# Patient Record
Sex: FEMALE | Race: WHITE | Employment: OTHER | URBAN - METROPOLITAN AREA
[De-identification: names, ages, dates, MRNs, and addresses within clinical notes are randomized per-mention and may not be internally consistent; named-entity substitution may affect disease eponyms.]

---

## 2017-01-01 ENCOUNTER — SNF VISIT (OUTPATIENT)
Dept: FAMILY MEDICINE CLINIC | Facility: CLINIC | Age: 82
End: 2017-01-01

## 2017-01-01 DIAGNOSIS — F41.1 ANXIETY STATE: ICD-10-CM

## 2017-01-01 DIAGNOSIS — I48.20 CHRONIC ATRIAL FIBRILLATION (HCC): ICD-10-CM

## 2017-01-01 DIAGNOSIS — M17.10 OSTEOARTHRITIS OF KNEE, UNSPECIFIED LATERALITY, UNSPECIFIED OSTEOARTHRITIS TYPE: ICD-10-CM

## 2017-01-01 DIAGNOSIS — R06.03 RESPIRATORY DISTRESS: ICD-10-CM

## 2017-01-01 DIAGNOSIS — I10 ESSENTIAL HYPERTENSION: ICD-10-CM

## 2017-01-01 DIAGNOSIS — R53.1 WEAKNESS GENERALIZED: ICD-10-CM

## 2017-01-01 DIAGNOSIS — Z71.89 ENCOUNTER FOR HOSPICE CARE DISCUSSION: Primary | ICD-10-CM

## 2017-01-01 DIAGNOSIS — J43.9 PULMONARY EMPHYSEMA, UNSPECIFIED EMPHYSEMA TYPE (HCC): ICD-10-CM

## 2017-01-01 DIAGNOSIS — H91.8X9 OTHER SPECIFIED FORMS OF HEARING LOSS, UNSPECIFIED LATERALITY: ICD-10-CM

## 2017-01-01 DIAGNOSIS — K21.9 GASTROESOPHAGEAL REFLUX DISEASE WITHOUT ESOPHAGITIS: ICD-10-CM

## 2017-01-01 DIAGNOSIS — Z91.81 AT RISK FOR FALLING: ICD-10-CM

## 2017-01-01 DIAGNOSIS — C91.10 CHRONIC LYMPHOCYTIC LEUKEMIA (HCC): ICD-10-CM

## 2017-01-01 DIAGNOSIS — G47.00 INSOMNIA, UNSPECIFIED: ICD-10-CM

## 2017-01-01 DIAGNOSIS — C91.10 CHRONIC LYMPHOCYTIC LEUKEMIA (HCC): Primary | ICD-10-CM

## 2017-01-01 DIAGNOSIS — J44.1 CHRONIC OBSTRUCTIVE PULMONARY DISEASE WITH ACUTE EXACERBATION (HCC): Primary | ICD-10-CM

## 2017-01-01 DIAGNOSIS — Z87.09 HISTORY OF PULMONARY EDEMA: ICD-10-CM

## 2017-01-01 DIAGNOSIS — D64.9 ANEMIA, UNSPECIFIED: ICD-10-CM

## 2017-01-01 PROCEDURE — 99310 SBSQ NF CARE HIGH MDM 45: CPT | Performed by: FAMILY MEDICINE

## 2017-01-01 PROCEDURE — 99307 SBSQ NF CARE SF MDM 10: CPT | Performed by: FAMILY MEDICINE

## 2017-02-19 PROBLEM — K21.9 GASTROESOPHAGEAL REFLUX DISEASE WITHOUT ESOPHAGITIS: Status: ACTIVE | Noted: 2017-01-01

## 2017-02-19 PROBLEM — J43.9 PULMONARY EMPHYSEMA (HCC): Status: ACTIVE | Noted: 2017-01-01

## 2017-02-19 NOTE — PROGRESS NOTES
[facility] long term care patient, [facility] Author: Sregio Borrero MD     1923 MRN [id number]   [facility]  Admission 16      Last Hospital Discharge 16 PCP Hnug Fischer [facility] of chest pain and denies any recent fevers or chills. Patient reports no urinary complaints and denies headaches or visual disturbances. Patient denies any abdominal pain at this time. Patient has no new skin lesions.       Allergies:  She has No Known Zach impairment; Arrhythmia; Congestive heart disease ([address] Cancer ([address] Anemia; COPD (chronic obstructive pulmonary disease) ([facility] [address] Shortness of breath; High blood pressure; and Anxiety state.     She  has past surgical history that includes hernia surgery (2 was reviewed in Epic:yes     ASSESSMENT/ PLAN:   70-year-old female from [facility] to [facility]     History of COPD and Chronic bronchitis,   -continue COPD meds.  Doing better with advair and pulmicort  -continue with both pulmicort and ad

## 2017-03-21 NOTE — PROGRESS NOTES
[facility] long term care patient, Amiraronny Farooq Author: Sree Moran MD     1923 MRN [id number]   [facility]  Admission 16      Last Hospital Discharge 16 PCP Hardin Cooks, [place] of Patient denies any shortness of breath, denies chest pain and denies any recent fevers or chills. Patient reports no urinary complaints and denies headaches or visual disturbances. Patient denies any abdominal pain at this time.  Patient has no new s localized, unspecified site; Hearing impairment; Visual impairment; Arrhythmia; Congestive heart disease ([facility] [address] Cancer ([facility] [address] Anemia; [facility]chronic [facility] Shortness of breath; High blood pressure; and Anxiety state.     She  h Imaging and labs reviewed in [facility]     Labs and Imaging was reviewed in Epic:yes     ASSESSMENT/ PLAN:   80-year-old female from [facility] to [facility]     History of COPD and Chronic bronchitis,   -continue COPD meds.  Doing better with ad

## 2017-04-09 NOTE — PROGRESS NOTES
[facility] long term care patient, [facility] Author: Thang Delatorre MD     1923 MRN [id number]   [facility]  Admission 16      Last Hospital Discharge 16 PCP Lynn Oppenheim, [facility] of continues to have decline in status and was started on Hospice this week due to Terminal COPD and worsening progression. Patient denies any acute shortness of breath, currently on 02, denies chest pain and denies any recent fevers or chills.   Patient r without mention of obstruction or gangrene (09/21/11); Unspecified essential hypertension; Osteoarthrosis, unspecified whether generalized or localized, unspecified site; Hearing impairment; Visual impairment; Arrhythmia; [facility] disease ([facility]  Ca Judgment and thought content normal.   Nursing note and vitals reviewed.        Lines:none  Drains: none  Wound:none    Medication Reviewed: yes   Imaging and labs reviewed in [facility]     Labs and Imaging was reviewed in Epic:yes     ASSESSMENT/ PLAN:   89-year

## 2017-04-20 ENCOUNTER — MED REC SCAN ONLY (OUTPATIENT)
Dept: FAMILY MEDICINE CLINIC | Facility: CLINIC | Age: 82
End: 2017-04-20

## 2017-05-02 ENCOUNTER — MED REC SCAN ONLY (OUTPATIENT)
Dept: FAMILY MEDICINE CLINIC | Facility: CLINIC | Age: 82
End: 2017-05-02